# Patient Record
Sex: MALE | Race: WHITE | NOT HISPANIC OR LATINO | Employment: OTHER | URBAN - METROPOLITAN AREA
[De-identification: names, ages, dates, MRNs, and addresses within clinical notes are randomized per-mention and may not be internally consistent; named-entity substitution may affect disease eponyms.]

---

## 2021-03-22 ENCOUNTER — BULK ORDERING (OUTPATIENT)
Dept: CASE MANAGEMENT | Facility: OTHER | Age: 71
End: 2021-03-22

## 2021-03-22 DIAGNOSIS — Z23 IMMUNIZATION DUE: ICD-10-CM

## 2023-11-15 NOTE — PROGRESS NOTES
New Shoulder      Patient: Jhon Ward        YOB: 1950    Medical Record Number: [id number]        Chief Complaints: Right shoulder pain      History of Present Illness: This is a 73-year-old male who is status post rotator cuff repair on the right in 2016 is doing great from that he presents complaining of left shoulder pain he is right-hand dominant is been ongoing for 2 months states he was playing a bunch of golf and lifting free weights and he thinks that is what started.  He has pain with overhead activity has pain at night he has tried rest strengthening exercises that he did on his opposite activity modification all with no lasting improvement      Allergies:   Allergies   Allergen Reactions    Sulfa Antibiotics        Medications:   Home Medications:  Current Outpatient Medications on File Prior to Visit   Medication Sig    Ascorbic Acid (VITAMIN C PO) Take  by mouth.    atorvastatin (LIPITOR) 20 MG tablet Take 1 tablet by mouth Daily.    Cholecalciferol (VITAMIN D PO) Take  by mouth.    Cyanocobalamin (B-12 PO) Take  by mouth.    IBUPROFEN PO Take  by mouth.    levothyroxine (SYNTHROID, LEVOTHROID) 75 MCG tablet Take 1 tablet by mouth Daily.    Melatonin 10 MG tablet Take  by mouth.    terazosin (HYTRIN) 5 MG capsule Take 1 capsule by mouth Every Night.    traZODone (DESYREL) 50 MG tablet Take 1 tablet by mouth Every Night.    oxyCODONE-acetaminophen (PERCOCET) 7.5-325 MG per tablet TK 1 TO 2 TS PO Q 4 H PRN P    SIMVASTATIN PO Take 10 mg by mouth.     No current facility-administered medications on file prior to visit.     Current Medications:  Scheduled Meds:  Continuous Infusions:No current facility-administered medications for this visit.    PRN Meds:.    History reviewed. No pertinent past medical history.     Past Surgical History:   Procedure Laterality Date    ROTATOR CUFF REPAIR  01/25/2016        Social History     Occupational History    Not on file   Tobacco Use     "Smoking status: Never    Smokeless tobacco: Not on file   Substance and Sexual Activity    Alcohol use: Yes     Comment: 2-3 drinks/week    Drug use: Not on file    Sexual activity: Not on file      Social History     Social History Narrative    Not on file      History reviewed. No pertinent family history.          Review of Systems:     Review of Systems      Physical Exam: 73 y.o. male  General Appearance:    Alert, cooperative, in no acute distress                   Vitals:    11/16/23 1450   Temp: 98.7 °F (37.1 °C)   TempSrc: Temporal   Weight: 79.5 kg (175 lb 4.8 oz)   Height: 182.9 cm (72\")   PainSc:   6   PainLoc: Shoulder      Patient is alert and read ×3 no acute distress appears her above-listed at height weight and age.  Affect is normal respiratory rate is normal unlabored. Heart rate regular rate rhythm, sclera, dentition and hearing are normal for the purpose of this exam.    Ortho Exam  Physical exam of the left shoulder reveals no overlying skin changes no lymphedema no lymphadenopathy.  Patient has active flexion 180 with mild symptoms abduction is similar external rotation is to 50 and internal rotation to the upper lumbar spine with mild symptoms.  Patient has good rotator cuff strength 4+ over 5 with isometric strength testing with pain.  Patient has a positive impingement and a positive Aguirre sign.  Patient has good cervical range of motion which is full and asymptomatic no radicular symptoms.  Patient has a normal elbow exam.  Good distal pulses are presentPatient has pain with overhead activity and a positive Neer sign and a positive empty can sign  They have a positive drop arm any definitive painful arc   Procedures          Radiology:   AP, Scapular Y and Axillary Lateral of the left shoulder were ordered/reviewed to evauate shoulder pain.  I have no comparative films he has some acromioclavicular arthritis and some sclerosis at the insertion of the cuff no other acute " pathology  Imaging Results (Most Recent)       Procedure Component Value Units Date/Time    XR Shoulder 2+ View Left [[id number]] Resulted: 11/16/23 1442     Updated: 11/16/23 1442    Impression:      Ordering physician's impression is located in the Encounter Note dated 11/16/23. X-ray performed in the DR room.            Assessment/Plan: Left shoulder pain I am concerned there is a rotator cuff tear here.  He is done a lot of conservative measures plan is to proceed with an MRI and he will follow-up after that test

## 2023-11-16 ENCOUNTER — OFFICE VISIT (OUTPATIENT)
Dept: ORTHOPEDIC SURGERY | Facility: CLINIC | Age: 73
End: 2023-11-16
Payer: MEDICARE

## 2023-11-16 VITALS — HEIGHT: 72 IN | TEMPERATURE: 98.7 F | BODY MASS INDEX: 23.74 KG/M2 | WEIGHT: 175.3 LBS

## 2023-11-16 DIAGNOSIS — M25.512 LEFT SHOULDER PAIN, UNSPECIFIED CHRONICITY: Primary | ICD-10-CM

## 2023-11-16 DIAGNOSIS — M75.102 NONTRAUMATIC TEAR OF LEFT ROTATOR CUFF, UNSPECIFIED TEAR EXTENT: ICD-10-CM

## 2023-11-16 PROCEDURE — 99203 OFFICE O/P NEW LOW 30 MIN: CPT | Performed by: ORTHOPAEDIC SURGERY

## 2023-11-16 RX ORDER — TRAZODONE HYDROCHLORIDE 50 MG/1
50 TABLET ORAL NIGHTLY
COMMUNITY

## 2023-11-16 RX ORDER — ATORVASTATIN CALCIUM 20 MG/1
20 TABLET, FILM COATED ORAL DAILY
COMMUNITY

## 2023-11-16 RX ORDER — LEVOTHYROXINE SODIUM 0.07 MG/1
75 TABLET ORAL DAILY
COMMUNITY

## 2023-11-16 RX ORDER — TERAZOSIN 5 MG/1
5 CAPSULE ORAL NIGHTLY
COMMUNITY

## 2023-11-16 RX ORDER — PHENOL 1.4 %
AEROSOL, SPRAY (ML) MUCOUS MEMBRANE
COMMUNITY

## 2023-12-05 ENCOUNTER — HOSPITAL ENCOUNTER (OUTPATIENT)
Dept: MRI IMAGING | Facility: HOSPITAL | Age: 73
Discharge: HOME | End: 2023-12-05
Admitting: ORTHOPAEDIC SURGERY
Payer: MEDICARE

## 2023-12-05 DIAGNOSIS — M75.102 NONTRAUMATIC TEAR OF LEFT ROTATOR CUFF, UNSPECIFIED TEAR EXTENT: ICD-10-CM

## 2023-12-05 PROCEDURE — 73221 MRI JOINT UPR EXTREM W/O DYE: CPT

## 2023-12-06 ENCOUNTER — TELEPHONE (OUTPATIENT)
Dept: ORTHOPEDIC SURGERY | Facility: CLINIC | Age: 73
End: 2023-12-06
Payer: MEDICARE

## 2023-12-19 NOTE — PROGRESS NOTES
Shoulder MRI Follow Up      Patient: Jhon Ward        YOB: 1950            Chief Complaints: Shoulder pain left      History of Present Illness: The patient is here follow-up of an MRI of the shoulder this is of the left shoulder MRI demonstrates a partial articular sided rotator cuff tear he states his shoulder is still bothering him      Physical Exam: 73 y.o. male  General Appearance:    Alert, cooperative, in no acute distress                 There were no vitals filed for this visit.     Patient is alert and read ×3 no acute distress appears her above-listed at height weight and age.  Affect is normal respiratory rate is normal unlabored. Heart rate regular rate rhythm, sclera, dentition and hearing are normal for the purpose of this exam.      Ortho Exam  Physical exam of the left shoulder reveals no overlying skin changes no lymphedema no lymphadenopathy.  Patient has active flexion 180 with mild symptoms abduction is similar external rotation is to 50 and internal rotation to the upper lumbar spine with mild symptoms.  Patient has good rotator cuff strength 4+ over 5 with isometric strength testing with pain.  Patient has a positive impingement and a positive Aguirre sign.  Patient has good cervical range of motion which is full and asymptomatic no radicular symptoms.  Patient has a normal elbow exam.  Good distal pulses are presentPatient has pain with overhead activity and a positive Neer sign and a positive empty can sign  They have a positive drop arm any definitive painful arc     MRI Results: MRIs as above have reviewed the films myself and agree with the findings  Large Joint Arthrocentesis: L subacromial bursa  Date/Time: 12/21/2023 9:29 AM  Consent given by: patient  Site marked: site marked  Timeout: Immediately prior to procedure a time out was called to verify the correct patient, procedure, equipment, support staff and site/side marked as required   Supporting  Documentation  Indications: pain   Procedure Details  Location: shoulder - L subacromial bursa  Preparation: Patient was prepped and draped in the usual sterile fashion  Needle gauge: 21G.  Approach: posterior  Medications administered: 80 mg methylPREDNISolone acetate 80 MG/ML; 2 mL lidocaine PF 1% 1 %  Patient tolerance: patient tolerated the procedure well with no immediate complications        Assessment/Plan: Left shoulder partial rotator cuff tear he has not had an injection at this point plan is to proceed with an injection and have him see physical therapy if he fails to improve we could pursue arthroscopic management I think this is less likely

## 2023-12-21 ENCOUNTER — OFFICE VISIT (OUTPATIENT)
Dept: ORTHOPEDIC SURGERY | Facility: CLINIC | Age: 73
End: 2023-12-21
Payer: MEDICARE

## 2023-12-21 VITALS — HEIGHT: 72 IN | WEIGHT: 183 LBS | BODY MASS INDEX: 24.79 KG/M2 | TEMPERATURE: 98.6 F

## 2023-12-21 DIAGNOSIS — M75.112 INCOMPLETE TEAR OF LEFT ROTATOR CUFF, UNSPECIFIED WHETHER TRAUMATIC: Primary | ICD-10-CM

## 2023-12-21 DIAGNOSIS — M75.112 NONTRAUMATIC INCOMPLETE TEAR OF LEFT ROTATOR CUFF: Primary | ICD-10-CM

## 2023-12-21 RX ORDER — METHYLPREDNISOLONE ACETATE 80 MG/ML
80 INJECTION, SUSPENSION INTRA-ARTICULAR; INTRALESIONAL; INTRAMUSCULAR; SOFT TISSUE
Status: COMPLETED | OUTPATIENT
Start: 2023-12-21 | End: 2023-12-21

## 2023-12-21 RX ORDER — LIDOCAINE HYDROCHLORIDE 10 MG/ML
2 INJECTION, SOLUTION EPIDURAL; INFILTRATION; INTRACAUDAL; PERINEURAL
Status: COMPLETED | OUTPATIENT
Start: 2023-12-21 | End: 2023-12-21

## 2023-12-21 RX ADMIN — METHYLPREDNISOLONE ACETATE 80 MG: 80 INJECTION, SUSPENSION INTRA-ARTICULAR; INTRALESIONAL; INTRAMUSCULAR; SOFT TISSUE at 09:29

## 2023-12-21 RX ADMIN — LIDOCAINE HYDROCHLORIDE 2 ML: 10 INJECTION, SOLUTION EPIDURAL; INFILTRATION; INTRACAUDAL; PERINEURAL at 09:29

## 2024-01-03 ENCOUNTER — TREATMENT (OUTPATIENT)
Dept: PHYSICAL THERAPY | Facility: CLINIC | Age: 74
End: 2024-01-03
Payer: MEDICARE

## 2024-01-03 DIAGNOSIS — M75.102 NONTRAUMATIC TEAR OF LEFT SUPRASPINATUS TENDON: ICD-10-CM

## 2024-01-03 DIAGNOSIS — M75.42 IMPINGEMENT SYNDROME OF LEFT SHOULDER: Primary | ICD-10-CM

## 2024-01-03 NOTE — PROGRESS NOTES
Physical Therapy Initial Evaluation and Plan of Care  [address]  [facility] [address]  [phone]    Patient: Jhon Ward   : 1950  Diagnosis/ICD-10 Code:  Impingement syndrome of left shoulder [M75.42]  Referring practitioner: Arabella Alvarado MD  Date of Initial Visit: 1/3/2024  Today's Date: 1/3/2024  Patient seen for 1 session         Visit Diagnoses:    ICD-10-CM ICD-9-CM   1. Impingement syndrome of left shoulder  M75.42 726.2   2. Nontraumatic tear of left supraspinatus tendon  M75.102 727.69         Subjective Questionnaire: QuickDASH: 11%      Subjective Evaluation    History of Present Illness  Mechanism of injury: Golf 1x/week  R RTC repair . 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    Scapulae  Right protracted.    Postural Observations  Seated posture: fair      Tenderness     Left Shoulder   Tenderness in the supraspinatus tendon.     Cervical/Thoracic Screen   Cervical range of motion within normal limits  Cervical range of motion within normal limits with the following exceptions: Quadrant L midscapular pain  Thoracic range of motion within normal limits with the following exceptions: Decreased T 4-6 mobility L    Active Range of Motion   Left Shoulder   Flexion: WFL  Extension: WFL  Abduction: WFL  External rotation BTH: WFL  Internal rotation BTB: WFL    Passive Range of Motion   Left Shoulder   Normal passive range of motion    Strength/Myotome Testing     Left Shoulder     Planes of Motion   Flexion: 5   Extension: 5   Abduction: 4+   External rotation at 0°: 4+   Internal rotation at 0°: 5     Isolated Muscles   Biceps: 5   Middle trapezius: 4-   Rhomboids: 5   Supraspinatus: 4+   Triceps: 5     Tests     Left Shoulder   Positive Lauri's.  "  Negative Neer's and Speed's.           Assessment & Plan       Assessment  Impairments: impaired physical strength, lacks appropriate home exercise program and pain with function   Functional limitations: lifting, sleeping, pulling, pushing, uncomfortable because of pain, reaching overhead and unable to perform repetitive tasks   Assessment details: Pt is a good candidate for skilled PT intervention to restore functional AROM and strength to return to previous level of ADL's.   Prognosis: good    Goals  Plan Goals: Short term Goals ( 3 weeks)    1. Pt to demonstrate proper scapulohumeral alignment to allow for improved AROM with less pain  2. Pt to be independent with HEP  4. Pt to exhibit 5/5  ER strength to allow for improved stability in GH joint with ADL\"s     Long Term Goals ( 6 weeks)    1. Pt to report min to no pain with recreational activities and household chores  2. Pt to exhibit 5/5 middle trap  strength throughout RTC musculature to allow for pushing, pulling activities with less pain.  3. Pt to exhibit good sitting and standing scapular posture w/o cues  4. Pt to score <5% on DASH     Plan  Therapy options: will be seen for skilled therapy services  Planned therapy interventions: home exercise program, manual therapy, neuromuscular re-education, postural training, soft tissue mobilization, spinal/joint mobilization, strengthening, stretching and therapeutic activities  Frequency: 1x week  Duration in weeks: 6  Treatment plan discussed with: patient            Timed:         Manual Therapy:    8     mins  68838;     Therapeutic Exercise:    15     mins  86940;     Neuromuscular Jonathan:        mins  44352;    Therapeutic Activity:          mins  85879;     Gait Training:           mins  45537;     Ultrasound:          mins  59125;    Ionto                                   mins   56402  Self Care                            mins   19944      Un-Timed:  Electrical Stimulation:         mins  93996 ( " );  Dry Needling          mins self-pay  Traction          mins 36687  Canalith Repos         mins 06586      Timed Treatment:   23   mins   Total Treatment:     53   mins          PT: Gudelia Sandra, PT     License Number: [id number]  Electronically signed by Gudelia Sandra PT, 01/03/24, 8:00 AM EST    Certification Period: 1/3/2024 thru 4/1/2024  I certify that the therapy services are furnished while this patient is under my care.  The services outlined above are required by this patient, and will be reviewed every 90 days.         Physician Signature:__________________________________________________    PHYSICIAN: Arabella Alvarado MD  NPI: [id number]                                      DATE:      Please sign and return via fax to [email or web address] . Thank you, [facility].

## 2024-01-04 NOTE — PATIENT INSTRUCTIONS
Access Code: [email or web address]  URL: [email or web address]  Date: 01/04/2024  Prepared by: Gudelia Sandra    Exercises  - Sidelying Shoulder ER with Towel and Dumbbell  - 1 x daily - 7 x weekly - 3 sets - 10-15 reps  - Standing Wall Ball Circles with Mini Swiss Ball  - 1 x daily - 7 x weekly - 1 sets - 10-20 reps  - Standing Single Arm Shoulder Abduction with Resistance  - 1 x daily - 7 x weekly - 1 sets - 10 reps

## 2024-01-10 ENCOUNTER — TREATMENT (OUTPATIENT)
Dept: PHYSICAL THERAPY | Facility: CLINIC | Age: 74
End: 2024-01-10
Payer: MEDICARE

## 2024-01-10 DIAGNOSIS — M75.42 IMPINGEMENT SYNDROME OF LEFT SHOULDER: Primary | ICD-10-CM

## 2024-01-10 DIAGNOSIS — M75.102 NONTRAUMATIC TEAR OF LEFT SUPRASPINATUS TENDON: ICD-10-CM

## 2024-01-10 NOTE — PROGRESS NOTES
Physical Therapy Daily Treatment Note  [address]  [facility] [address]  [phone]    Patient: Jhon Ward   : 1950  Referring practitioner: Arabella Alvarado MD  Date of Initial Visit: No linked episodes  Today's Date: 1/10/2024  Patient seen for Visit count could not be calculated. Make sure you are using a visit which is associated with an episode. sessions       Visit Diagnoses:    ICD-10-CM ICD-9-CM   1. Impingement syndrome of left shoulder  M75.42 726.2   2. Nontraumatic tear of left supraspinatus tendon  M75.102 727.69       Jhon Ward reports: Compliant with HEP but would like to review to make sure I am doing correctly      Objective   See Exercise, Manual, and Modality Logs for complete treatment.       Assessment/Plan  Needed verbal and tactile cues for proper GH/scapular positioning with exercises. Added prone middle trap and advised to do reverse flys at gym vs regular flys    Progress per Plan of Care        Timed:         Manual Therapy:    10     mins  83023;     Therapeutic Exercise:    15     mins  72372;     Neuromuscular Jonathan:        mins  02293;    Therapeutic Activity:          mins  87420;     Gait Training:           mins  07999;     Ultrasound:          mins  02497;    Ionto                                   mins   41904  Self Care                            mins   41077      Un-Timed:  Electrical Stimulation:         mins  67876 ( );  Dry Needling          mins self-pay  Traction          mins 17277  Canalith Repos         mins 63934      Timed Treatment:   25   mins   Total Treatment:     25   mins    Gudelia Sandra PT  Physical Therapist  [place] License # [id number]

## 2024-01-19 ENCOUNTER — TREATMENT (OUTPATIENT)
Dept: PHYSICAL THERAPY | Facility: CLINIC | Age: 74
End: 2024-01-19
Payer: MEDICARE

## 2024-01-19 DIAGNOSIS — M75.42 IMPINGEMENT SYNDROME OF LEFT SHOULDER: Primary | ICD-10-CM

## 2024-01-19 DIAGNOSIS — M75.102 NONTRAUMATIC TEAR OF LEFT SUPRASPINATUS TENDON: ICD-10-CM

## 2024-01-19 PROCEDURE — 97140 MANUAL THERAPY 1/> REGIONS: CPT | Performed by: PHYSICAL THERAPIST

## 2024-01-19 PROCEDURE — 97110 THERAPEUTIC EXERCISES: CPT | Performed by: PHYSICAL THERAPIST

## 2024-01-19 NOTE — PROGRESS NOTES
Physical Therapy Daily Treatment Note  [address]  [facility] [address]  [phone]    Patient: Jhon Ward   : 1950  Referring practitioner: Arabella Alvarado MD  Date of Initial Visit: Type: THERAPY  Noted: 1/3/2024  Today's Date: 2024  Patient seen for 3 sessions       Visit Diagnoses:    ICD-10-CM ICD-9-CM   1. Impingement syndrome of left shoulder  M75.42 726.2   2. Nontraumatic tear of left supraspinatus tendon  M75.102 727.69       Jhon Ward reports: Less pain at night but still some random pains when I move arm out to side, like picking up  bottle in waiting room this morning. Doing reverse flys at the gym      Objective   See Exercise, Manual, and Modality Logs for complete treatment.       Assessment/Plan  Progressed to shoulder taps and tricep pushups at counter.  at supraspinatus insertion. Full AROM and  PROM    Progress per Plan of Care and Progress strengthening /stabilization /functional activity        Timed:         Manual Therapy:    10     mins  37176;     Therapeutic Exercise:    20     mins  41112;     Neuromuscular Jonathan:        mins  67185;    Therapeutic Activity:          mins  36124;     Gait Training:           mins  20762;     Ultrasound:          mins  79449;    Ionto                                   mins   05657  Self Care                            mins   65972      Un-Timed:  Electrical Stimulation:         mins  20707 ( );  Dry Needling          mins self-pay  Traction          mins 42498  Canalith Repos         mins 25325      Timed Treatment:   30   mins   Total Treatment:     30   mins    Gudelia Sandra PT  Physical Therapist  [place] License # [id number]

## 2024-01-23 ENCOUNTER — TREATMENT (OUTPATIENT)
Dept: PHYSICAL THERAPY | Facility: CLINIC | Age: 74
End: 2024-01-23
Payer: MEDICARE

## 2024-01-23 DIAGNOSIS — M75.102 NONTRAUMATIC TEAR OF LEFT SUPRASPINATUS TENDON: ICD-10-CM

## 2024-01-23 DIAGNOSIS — M75.42 IMPINGEMENT SYNDROME OF LEFT SHOULDER: Primary | ICD-10-CM

## 2024-01-23 PROCEDURE — 97110 THERAPEUTIC EXERCISES: CPT | Performed by: PHYSICAL THERAPIST

## 2024-01-23 PROCEDURE — 97140 MANUAL THERAPY 1/> REGIONS: CPT | Performed by: PHYSICAL THERAPIST

## 2024-01-23 NOTE — PROGRESS NOTES
Physical Therapy Daily Treatment Note  [address]  [facility] [address]  [phone]    Patient: Jhon Ward   : 1950  Referring practitioner: Arabella Alvarado MD  Date of Initial Visit: Type: THERAPY  Noted: 1/3/2024  Today's Date: 2024  Patient seen for 4 sessions       Visit Diagnoses:    ICD-10-CM ICD-9-CM   1. Impingement syndrome of left shoulder  M75.42 726.2   2. Nontraumatic tear of left supraspinatus tendon  M75.102 727.69       Jhon Ward reports: Had a fever for one day and now have some congestion in my chest. Covid (-). Seeing MD Friday. Shoulder was deep achy sore after last session for a day and a half. I am playing golf this Thursday. Swung club in simulator without too much difficulty      Objective   See Exercise, Manual, and Modality Logs for complete treatment.       Assessment/Plan  Mild soreness with RS at 90 flexion with resisted flexion and hor abd. PROM remains painfree. Progressed ER strengthening to elevated at ~ 70 ABD. Fatigue but no pain    Progress per Plan of Care        Timed:         Manual Therapy:    12     mins  15677;     Therapeutic Exercise:    20     mins  37353;     Neuromuscular Jonathan:        mins  56767;    Therapeutic Activity:          mins  51474;     Gait Training:           mins  27787;     Ultrasound:          mins  71162;    Ionto                                  mins   15899  Self Care                            mins   82799      Un-Timed:  Electrical Stimulation:         mins  58456 ( );  Dry Needling          mins self-pay  Traction          mins 60833  Canalith Repos         mins 05930      Timed Treatment:   32   mins   Total Treatment:     32   mins    Gudelia Sandra PT  Physical Therapist  [place] License # [id number]

## 2024-01-31 ENCOUNTER — TREATMENT (OUTPATIENT)
Dept: PHYSICAL THERAPY | Facility: CLINIC | Age: 74
End: 2024-01-31
Payer: MEDICARE

## 2024-01-31 DIAGNOSIS — M75.102 NONTRAUMATIC TEAR OF LEFT SUPRASPINATUS TENDON: ICD-10-CM

## 2024-01-31 DIAGNOSIS — M75.42 IMPINGEMENT SYNDROME OF LEFT SHOULDER: Primary | ICD-10-CM

## 2024-01-31 PROCEDURE — 97110 THERAPEUTIC EXERCISES: CPT | Performed by: PHYSICAL THERAPIST

## 2024-01-31 PROCEDURE — 97140 MANUAL THERAPY 1/> REGIONS: CPT | Performed by: PHYSICAL THERAPIST

## 2024-01-31 NOTE — PROGRESS NOTES
"Physical Therapy Discharge Summary  [address]  [place] [address]  [phone]  Patient: Jhon Ward   : 1950  Diagnosis/ICD-10 Code:  Impingement syndrome of left shoulder [M75.42]  Referring practitioner: Arabella Alvarado MD  Date of Initial Visit: Type: THERAPY  Noted: 1/3/2024  Today's Date: 2024  Patient seen for 5 sessions         Visit Diagnoses:    ICD-10-CM ICD-9-CM   1. Impingement syndrome of left shoulder  M75.42 726.2   2. Nontraumatic tear of left supraspinatus tendon  M75.102 727.69         Jhon Ward reports: Improved strength. Had a couple tweaks in it whl eplaying golf but overall some improvement.  Subjective Questionnaire: QuickDASH: 9%  Clinical Progress: improved  Home Program Compliance: Yes  Treatment has included: therapeutic exercise, neuromuscular re-education, manual therapy, and therapeutic activity      Subjective       Objective   Strength/Myotome Testing      Left Shoulder      Planes of Motion   Flexion: 5   Extension: 5   Abduction: 5  External rotation at 0°: 5   Internal rotation at 0°: 5      Isolated Muscles   Biceps: 5   Middle trapezius: 5  Rhomboids: 5   Supraspinatus: 5   Triceps: 5      Tests      Left Shoulder   Positive Hawkin's.     Assessment/Plan  ADvised pt to continue with RTC and scapular strengthening and postural exercise. If he stops showing improvement recommend returning to MD    Short term Goals ( 3 weeks) MET     1. Pt to demonstrate proper scapulohumeral alignment to allow for improved AROM with less pain  2. Pt to be independent with HEP  4. Pt to exhibit 5/5  ER strength to allow for improved stability in GH joint with ADL\"s      Long Term Goals ( 6 weeks)     1. Pt to report min to no pain with recreational activities and household chores IMPROVED  2. Pt to exhibit 5/5 middle trap  strength throughout RTC musculature to allow for pushing, pulling activities with less pain.MET  3. Pt to exhibit good sitting and " standing scapular posture w/o cues MET  4. Pt to score <5% on DASH  IMPROVED     Recommendations: Discharge      Timed:         Manual Therapy:    8     mins  61719;     Therapeutic Exercise:    20     mins  12338;     Neuromuscular Jonathan:        mins  63806;    Therapeutic Activity:          mins  91948;     Gait Training:          mins  27654;     Ultrasound:          mins  85138;    Ionto                                   mins   23733  Self Care                            mins   44145  Canalith Repos         mins 56663      Un-Timed:  Electrical Stimulation:         mins  07530 (MC );  Dry Needling          mins self-pay  Traction          mins 36549  Re-Eval                               mins  00369      Timed Treatment:   28   mins   Total Treatment:     28   mins          PT: Gudelia Sandra PT     [place] License:  [id number]    Electronically signed by Gudelia Sandra PT, 01/31/24, 7:34 AM EST    Certification Period: 1/31/2024 thru 4/29/2024  I certify that the therapy services are furnished while this patient is under my care.  The services outlined above are required by this patient, and will be reviewed every 90 days.         Physician Signature:__________________________________________________    PHYSICIAN: Arabella Alvarado MD  NPI: [id number]                                      DATE:  :     Please sign and return via fax to [email or web address] . Thank you, [facility] Physical Therapy
